# Patient Record
Sex: MALE | Race: WHITE | NOT HISPANIC OR LATINO | URBAN - METROPOLITAN AREA
[De-identification: names, ages, dates, MRNs, and addresses within clinical notes are randomized per-mention and may not be internally consistent; named-entity substitution may affect disease eponyms.]

---
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
## 2021-06-02 VITALS — HEIGHT: 71 IN | WEIGHT: 142 LBS | BODY MASS INDEX: 19.88 KG/M2

## 2021-06-18 NOTE — LETTER
Letter by Brandon Garcia MD at      Author: Brandon Garcia MD Service: -- Author Type: --    Filed:  Encounter Date: 2/25/2019 Status: (Other)       Ousmane Simpson  [address]  [place] [address]             February 25, 2019         Dear Mr. Simpson,    Below are the results from your recent visit:    Resulted Orders   HM2(CBC w/o Differential)   Result Value Ref Range    WBC 5.7 4.0 - 11.0 thou/uL    RBC 4.95 4.40 - 6.20 mill/uL    Hemoglobin 15.5 14.0 - 18.0 g/dL    Hematocrit 43.9 40.0 - 54.0 %    MCV 89 80 - 100 fL    MCH 31.3 27.0 - 34.0 pg    MCHC 35.4 32.0 - 36.0 g/dL    RDW 12.1 11.0 - 14.5 %    Platelets 221 140 - 440 thou/uL    MPV 7.9 7.0 - 10.0 fL   Thyroid Stimulating Hormone (TSH)   Result Value Ref Range    TSH 1.88 0.30 - 5.00 uIU/mL   Comprehensive Metabolic Panel   Result Value Ref Range    Sodium 142 136 - 145 mmol/L    Potassium 3.8 3.5 - 5.0 mmol/L    Chloride 105 98 - 107 mmol/L    CO2 29 22 - 31 mmol/L    Anion Gap, Calculation 8 5 - 18 mmol/L    Glucose 72 70 - 125 mg/dL    BUN 12 8 - 22 mg/dL    Creatinine 1.02 0.70 - 1.30 mg/dL    GFR MDRD Af Amer >60 >60 mL/min/1.73m2    GFR MDRD Non Af Amer >60 >60 mL/min/1.73m2    Bilirubin, Total 0.9 0.0 - 1.0 mg/dL    Calcium 9.6 8.5 - 10.5 mg/dL    Protein, Total 7.3 6.0 - 8.0 g/dL    Albumin 4.5 3.5 - 5.0 g/dL    Alkaline Phosphatase 84 50 - 364 U/L    AST 13 0 - 40 U/L    ALT 10 0 - 45 U/L    Narrative    Fasting Glucose reference range is 70-99 mg/dL per  American Diabetes Association (ADA) guidelines.   Cholesterol, Total   Result Value Ref Range    Cholesterol 113 <=199 mg/dL   Vitamin D, Total (25-Hydroxy)   Result Value Ref Range    Vitamin D, Total (25-Hydroxy) 43.0 30.0 - 80.0 ng/mL    Narrative    Deficiency <10.0 ng/mL  Insufficiency 10.0-29.9 ng/mL  Sufficiency 30.0-80.0 ng/mL  Toxicity (possible) >100.0 ng/mL   Urinalysis-UC if Indicated   Result Value Ref Range    Color, UA Yellow Colorless, Yellow, Straw, Light Yellow     Clarity, UA Clear Clear    Glucose, UA Negative Negative    Bilirubin, UA Negative Negative    Ketones, UA Negative Negative    Specific Gravity, UA 1.025 1.005 - 1.030    Blood, UA Negative Negative    pH, UA 6.5 5.0 - 8.0    Protein, UA 30 mg/dL (!) Negative mg/dL    Urobilinogen, UA 0.2 E.U./dL 0.2 E.U./dL, 1.0 E.U./dL    Nitrite, UA Negative Negative    Leukocytes, UA Negative Negative    Bacteria, UA None Seen None Seen hpf    RBC, UA None Seen None Seen, 0-2 hpf    WBC, UA None Seen None Seen, 0-5 hpf    Squam Epithel, UA 0-5 None Seen, 0-5 lpf    Mucus, UA Few (!) None Seen lpf    Narrative    UC not indicated       Normal all labs. Good!    Please call with questions or contact us using [employer].    Sincerely,        Electronically signed by Brandon Garcia MD

## 2021-06-24 NOTE — PROGRESS NOTES
Office Visit - New Patient Physical    Ousmane RICH Simpson   18 y.o.  male    Date of visit: 2/20/2019  Physician: Brandon Garcia MD     Assessment and Plan   1. Routine general medical examination at a health care facility  Advised his comprehensive physical exam is normal but he has low BMI which suggests mild undernourishment.  Encouraged to eat more and gain a little more weight.  Check total cholesterol and urinalysis.  - Cholesterol, Total  - Urinalysis-UC if Indicated    2. Moderate episode of recurrent major depressive disorder (H)  Followed by psychiatry every 3 months.  Takes were bupropion and buspirone combination.  Was just seen by his psychiatrist this morning.  Check CMP and vitamin D.  - Comprehensive Metabolic Panel  - Vitamin D, Total (25-Hydroxy)    3. Generalized anxiety disorder  Has anxiety which comes and goes.  Also followed by psychiatry.  Complains of intermittent hand tremors.  Check TSH and CBC.  - HM2(CBC w/o Differential)  - Thyroid Stimulating Hormone (TSH)    Return to clinic as needed.     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 Senior in HS. Does not smoke cigarettes.        Past Medical History   There is no problem list on file for this patient.      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     History of Present Illness   This 18 y.o. old male is here for his comprehensive physical exam. First time to see me.  Was followed by a pediatrician before he reached 18 years old.  Has major depression and anxiety.  Followed and treated by psychiatry.  Sees a psychiatrist every 3 months.  Complains of some hand tremors which I think it  "is due t his anxiety.  Has low BMI.  Wears corrective lenses for distance.  Was seen by his eye doctor a year ago.  Hears well.  Denies chest pains and shortness of breath.  Denies urinary and bowel symptoms.  Sleeps well.  Not sexually active.    Review of Systems   A 12 point comprehensive review of systems was negative except as noted.         Medications and Allergies   Current Outpatient Medications   Medication Sig     buPROPion (WELLBUTRIN XL) 300 MG 24 hr tablet Take 300 mg by mouth daily.     busPIRone (BUSPAR) 7.5 MG tablet Take 7.5 mg by mouth daily.     No Known Allergies     Family and Social History   No family history on file.     Social History     Tobacco Use     Smoking status: Never Smoker     Smokeless tobacco: Never Used   Substance Use Topics     Alcohol use: No     Frequency: Never     Drug use: No        Physical Exam   Physical Exam  /60   Pulse 87   Ht 5' 11\" (1.803 m)   Wt 142 lb (64.4 kg)   SpO2 99%   BMI 19.80 kg/m      General Appearance:    Alert, cooperative, no distress, appears stated age, pleasant   Head:    Normocephalic, without obvious abnormality, atraumatic   Eyes:    PERRL, conjunctiva/corneas clear, EOM's intact, fundi     benign, both eyes        Ears:    Normal TM's and external ear canals, both ears   Nose:   Nares normal, septum midline, mucosa normal, no drainage    or sinus tenderness   Throat:   Lips, mucosa, and tongue normal; teeth and gums normal   Neck:   Supple, symmetrical, trachea midline, no adenopathy;        thyroid:  No enlargement/tenderness/nodules; no carotid    bruit or JVD   Back:     Symmetric, no curvature, ROM normal, no CVA tenderness   Lungs:     Clear to auscultation bilaterally, respirations unlabored   Chest wall:    No tenderness or deformity   Heart:    Regular rate and rhythm, S1 and S2 normal, no murmur, rub   or gallop   Abdomen:     Soft, non-tender, bowel sounds active all four quadrants,     no masses, no organomegaly "   Genitalia:    Normal male without lesion, discharge or tenderness,    uncircumcised   Rectal:    Deferred, normal perianal exam   Extremities:   Extremities normal, atraumatic, no cyanosis or edema   Pulses:   2+ and symmetric all extremities   Skin:   Skin color, texture, turgor normal, no rashes or lesions   Lymph nodes:   Cervical, supraclavicular, and axillary nodes normal   Neurologic:   CNII-XII intact. Normal strength, sensation and reflexes       throughout        Additional Information        Brandon Garcia MD  Internal Medicine  Contact me at None